# Patient Record
Sex: MALE | Race: WHITE | Employment: UNEMPLOYED | URBAN - METROPOLITAN AREA
[De-identification: names, ages, dates, MRNs, and addresses within clinical notes are randomized per-mention and may not be internally consistent; named-entity substitution may affect disease eponyms.]

---

## 2022-08-10 ENCOUNTER — OFFICE VISIT (OUTPATIENT)
Dept: FAMILY MEDICINE CLINIC | Age: 52
End: 2022-08-10
Payer: COMMERCIAL

## 2022-08-10 VITALS
HEIGHT: 70 IN | HEART RATE: 60 BPM | BODY MASS INDEX: 32.61 KG/M2 | WEIGHT: 227.8 LBS | TEMPERATURE: 98 F | SYSTOLIC BLOOD PRESSURE: 156 MMHG | OXYGEN SATURATION: 97 % | DIASTOLIC BLOOD PRESSURE: 77 MMHG

## 2022-08-10 DIAGNOSIS — E78.00 PURE HYPERCHOLESTEROLEMIA: ICD-10-CM

## 2022-08-10 DIAGNOSIS — R07.89 ATYPICAL CHEST PAIN: ICD-10-CM

## 2022-08-10 DIAGNOSIS — R94.31 ABNORMAL EKG: Primary | ICD-10-CM

## 2022-08-10 DIAGNOSIS — J45.40 MODERATE PERSISTENT ASTHMA WITHOUT COMPLICATION: ICD-10-CM

## 2022-08-10 DIAGNOSIS — R03.0 ELEVATED BLOOD PRESSURE READING IN OFFICE WITHOUT DIAGNOSIS OF HYPERTENSION: ICD-10-CM

## 2022-08-10 LAB
ALBUMIN SERPL-MCNC: 4.1 G/DL (ref 3.5–5)
ALBUMIN/GLOB SERPL: 1.2 {RATIO} (ref 1.1–2.2)
ALP SERPL-CCNC: 87 U/L (ref 45–117)
ALT SERPL-CCNC: 30 U/L (ref 12–78)
ANION GAP SERPL CALC-SCNC: 7 MMOL/L (ref 5–15)
AST SERPL-CCNC: 18 U/L (ref 15–37)
BILIRUB SERPL-MCNC: 0.5 MG/DL (ref 0.2–1)
BUN SERPL-MCNC: 14 MG/DL (ref 6–20)
BUN/CREAT SERPL: 15 (ref 12–20)
CALCIUM SERPL-MCNC: 9.7 MG/DL (ref 8.5–10.1)
CHLORIDE SERPL-SCNC: 109 MMOL/L (ref 97–108)
CHOLEST SERPL-MCNC: 277 MG/DL
CO2 SERPL-SCNC: 25 MMOL/L (ref 21–32)
CREAT SERPL-MCNC: 0.94 MG/DL (ref 0.7–1.3)
ERYTHROCYTE [DISTWIDTH] IN BLOOD BY AUTOMATED COUNT: 13.3 % (ref 11.5–14.5)
EST. AVERAGE GLUCOSE BLD GHB EST-MCNC: 117 MG/DL
GLOBULIN SER CALC-MCNC: 3.3 G/DL (ref 2–4)
GLUCOSE SERPL-MCNC: 105 MG/DL (ref 65–100)
HBA1C MFR BLD: 5.7 % (ref 4–5.6)
HCT VFR BLD AUTO: 44 % (ref 36.6–50.3)
HDLC SERPL-MCNC: 60 MG/DL
HDLC SERPL: 4.6 {RATIO} (ref 0–5)
HGB BLD-MCNC: 14.5 G/DL (ref 12.1–17)
LDLC SERPL CALC-MCNC: 194.6 MG/DL (ref 0–100)
MCH RBC QN AUTO: 27.8 PG (ref 26–34)
MCHC RBC AUTO-ENTMCNC: 33 G/DL (ref 30–36.5)
MCV RBC AUTO: 84.5 FL (ref 80–99)
NRBC # BLD: 0 K/UL (ref 0–0.01)
NRBC BLD-RTO: 0 PER 100 WBC
PLATELET # BLD AUTO: 172 K/UL (ref 150–400)
PMV BLD AUTO: 12.8 FL (ref 8.9–12.9)
POTASSIUM SERPL-SCNC: 4.5 MMOL/L (ref 3.5–5.1)
PROT SERPL-MCNC: 7.4 G/DL (ref 6.4–8.2)
RBC # BLD AUTO: 5.21 M/UL (ref 4.1–5.7)
SODIUM SERPL-SCNC: 141 MMOL/L (ref 136–145)
TRIGL SERPL-MCNC: 112 MG/DL (ref ?–150)
TSH SERPL DL<=0.05 MIU/L-ACNC: 0.91 UIU/ML (ref 0.36–3.74)
VLDLC SERPL CALC-MCNC: 22.4 MG/DL
WBC # BLD AUTO: 7.8 K/UL (ref 4.1–11.1)

## 2022-08-10 PROCEDURE — 93000 ELECTROCARDIOGRAM COMPLETE: CPT | Performed by: STUDENT IN AN ORGANIZED HEALTH CARE EDUCATION/TRAINING PROGRAM

## 2022-08-10 PROCEDURE — 99204 OFFICE O/P NEW MOD 45 MIN: CPT | Performed by: STUDENT IN AN ORGANIZED HEALTH CARE EDUCATION/TRAINING PROGRAM

## 2022-08-10 RX ORDER — ALBUTEROL SULFATE 0.83 MG/ML
2.5 SOLUTION RESPIRATORY (INHALATION) ONCE
Qty: 30 NEBULE | Refills: 2 | Status: SHIPPED | OUTPATIENT
Start: 2022-08-10 | End: 2022-08-10

## 2022-08-10 RX ORDER — MONTELUKAST SODIUM 10 MG/1
10 TABLET ORAL DAILY
Qty: 90 TABLET | Refills: 1 | Status: SHIPPED | OUTPATIENT
Start: 2022-08-10

## 2022-08-10 RX ORDER — ALBUTEROL SULFATE 90 UG/1
2 AEROSOL, METERED RESPIRATORY (INHALATION)
Qty: 1 EACH | Refills: 0 | Status: SHIPPED | OUTPATIENT
Start: 2022-08-10

## 2022-08-10 RX ORDER — FLUTICASONE PROPIONATE 110 UG/1
1 AEROSOL, METERED RESPIRATORY (INHALATION) EVERY 12 HOURS
Qty: 12 G | Refills: 0 | Status: SHIPPED | OUTPATIENT
Start: 2022-08-10

## 2022-08-10 NOTE — LETTER
8/11/2022    Mr. Diamante Knott.  [facility] [address]      Dear Diamante Knott. I attempted to call you to let you know about the results listed below:    Lab Results   Component Value Date/Time    WBC 7.8 08/10/2022 10:15 AM    HGB 14.5 08/10/2022 10:15 AM    HCT 44.0 08/10/2022 10:15 AM    PLATELET 310 58/90/6800 10:15 AM    MCV 84.5 08/10/2022 10:15 AM       Lab Results   Component Value Date/Time    Sodium 141 08/10/2022 10:15 AM    Potassium 4.5 08/10/2022 10:15 AM    Chloride 109 (H) 08/10/2022 10:15 AM    CO2 25 08/10/2022 10:15 AM    Anion gap 7 08/10/2022 10:15 AM    Glucose 105 (H) 08/10/2022 10:15 AM    BUN 14 08/10/2022 10:15 AM    Creatinine 0.94 08/10/2022 10:15 AM    BUN/Creatinine ratio 15 08/10/2022 10:15 AM    GFR est AA >60 08/10/2022 10:15 AM    GFR est non-AA >60 08/10/2022 10:15 AM    Calcium 9.7 08/10/2022 10:15 AM    Bilirubin, total 0.5 08/10/2022 10:15 AM    Alk. phosphatase 87 08/10/2022 10:15 AM    Protein, total 7.4 08/10/2022 10:15 AM    Albumin 4.1 08/10/2022 10:15 AM    Globulin 3.3 08/10/2022 10:15 AM    A-G Ratio 1.2 08/10/2022 10:15 AM    ALT (SGPT) 30 08/10/2022 10:15 AM    AST (SGOT) 18 08/10/2022 10:15 AM       Lab Results   Component Value Date/Time    Cholesterol, total 277 (H) 08/10/2022 10:15 AM    HDL Cholesterol 60 08/10/2022 10:15 AM    LDL, calculated 194.6 (H) 08/10/2022 10:15 AM    VLDL, calculated 22.4 08/10/2022 10:15 AM    Triglyceride 112 08/10/2022 10:15 AM    CHOL/HDL Ratio 4.6 08/10/2022 10:15 AM       Lab Results   Component Value Date/Time    Hemoglobin A1c 5.7 (H) 08/10/2022 10:15 AM       Lab Results   Component Value Date/Time    TSH 0.91 08/10/2022 10:15 AM       RECOMMENDATIONS: The liver, kidneys, blood count, and thyroid are normal. Te=he  The cholesterol (lipid panel) is very high. I recommend starting a cholesterol-lowering medication. I have sent this in to your pharmacy.  Otherwise, we can discuss medications vs lifestyle changes at your upcoming visit. Follow up: 8/24/2022 as scheduled to further discuss results. If you have any questions or concerns, please send me a message in [employer] and I will get back to you usually within 24-48 hours. If you have any issues, please call our [address] at [phone]. For any acute concerns or worsening of any condition, please schedule a follow up appointment with the first available physician.       Sincerely,    Petra Griffiths MD

## 2022-08-10 NOTE — PATIENT INSTRUCTIONS
Please call Central Scheduling [phone] to set up the appointment for the echocardiogram of the heart.

## 2022-08-10 NOTE — PROGRESS NOTES
Colonoscopy: No  Shingles: No  TDAP: 7 years ago  Covid: NO  Identified Patient with two Patient identifiers (Name and ). Two Patient Identifiers confirmed. Reviewed record in preparation for visit and have obtained necessary documentation. Chief Complaint   Patient presents with    Establish Care     Visit Vitals  BP (!) 156/77 (BP 1 Location: Left upper arm, BP Patient Position: Sitting, BP Cuff Size: Large adult)   Pulse 60   Temp 98 °F (36.7 °C) (Oral)   Ht 5' 10\" (1.778 m)   Wt 227 lb 12.8 oz (103.3 kg)   SpO2 97%   BMI 32.69 kg/m²     1. Have you been to the ER, urgent care clinic since your last visit? Hospitalized since your last visit? No    2. Have you seen or consulted any other health care providers outside of the [address] [place] [address] since your last visit? Include any pap smears or colon screening.  No  Health Maintenance Due   Topic Date Due    Hepatitis C Screening  Never done    COVID-19 Vaccine (1) Never done    DTaP/Tdap/Td series (1 - Tdap) Never done    Lipid Screen  Never done    Colorectal Cancer Screening Combo  Never done    Shingrix Vaccine Age 50> (1 of 2) Never done

## 2022-08-10 NOTE — PROGRESS NOTES
Shaheed Fernández. is an 49 y.o. 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, mom works full time  - wants to get more active    #\"ting\" in chest  - not pressure or tightness  - no chest discomfort with exercise  - feels out of shape but no chest discomfort  - no issues going up steps or normal everyday activities  - no diaphoresis or SOB with the discomfort    Feeling odd pressures/aches/pains    #skin lesion on his side  - since 2000  - has grown  - moved with weight gain  - no personal or family history of skin cancer    Allergies - reviewed: Allergies   Allergen Reactions    Iodine Shortness of Breath         Medications - reviewed:   Current Outpatient Medications   Medication Sig    albuterol (PROVENTIL HFA, VENTOLIN HFA, PROAIR HFA) 90 mcg/actuation inhaler Take 2 Puffs by inhalation every six (6) hours as needed for Wheezing. albuterol (PROVENTIL VENTOLIN) 2.5 mg /3 mL (0.083 %) nebu Take 3 mL by inhalation once for 1 dose. fluticasone propionate (FLOVENT HFA) 110 mcg/actuation inhaler Take 1 Puff by inhalation every twelve (12) hours. montelukast (SINGULAIR) 10 mg tablet Take 1 Tablet by mouth in the morning. No current facility-administered medications for this visit. Past Medical History - reviewed:  Past Medical History:   Diagnosis Date    Asthma     Kidney stones          Past Surgical History - reviewed:   Past Surgical History:   Procedure Laterality Date    HX ORTHOPAEDIC           Social History - reviewed:   EtOH none  Tob - never smoker   Marijuana occasionally    ROS: See HPI      Physical Exam  Visit Vitals  BP (!) 156/77 (BP 1 Location: Left upper arm, BP Patient Position: Sitting, BP Cuff Size: Large adult)   Pulse 60   Temp 98 °F (36.7 °C) (Oral)   Ht 5' 10\" (1.778 m)   Wt 227 lb 12.8 oz (103.3 kg)   SpO2 97%   BMI 32.69 kg/m²       General appearance - alert, well appearing, and in no distress  Eyes - pupils equal and reactive, extraocular eye movements intact  Ears - bilateral TM's and external ear canals normal  Nose - normal and patent, no erythema, discharge or polyps  Mouth - mucous membranes moist, pharynx normal without lesions  Neck - supple, no significant adenopathy  Chest - clear to auscultation, no wheezes, rales or rhonchi, symmetric air entry  Heart - normal rate, regularly irregular rhythm, no murmurs, rubs, clicks or gallops  Abdomen - soft, nontender, nondistended, no masses or organomegaly  Neurological - alert, oriented, normal speech, no focal findings or movement disorder noted  Musculoskeletal - no joint tenderness, deformity or swelling  Extremities - peripheral pulses normal, no pedal edema, no clubbing or cyanosis  Skin - large hyperpigmented, scaly, stuck on plaque with central secondary changes from abrasion on left lower abdomen      Assessment/Plan  1. Chest pain, unspecified type  Not concerning for angina, but given EKG below would like cardiology referral.   - AMB POC EKG ROUTINE W/ 12 LEADS, INTER & REP    2. Abnormal EKG  Bigeminy noted on EKG and regularly irregular heart beat on exam. No syncope/LOC, lightheadedness. Echo and referral ordered; Gave number for echo scheduling and cardiology referral for Dr. Aleena Partida.   - Jp Goes; Future  - ECHO ADULT COMPLETE; Future    3. Moderate persistent asthma without complication  Has not tolerated ICS in past due to side effects of triggering migraines. Has not tried in several years however. Will trial montelukast, then ICS.  If no improvement, will need referral to pulm since advanced therapies are always in conjunction with ICS and I am not sure of safety of monotherapy. - albuterol (PROVENTIL HFA, VENTOLIN HFA, PROAIR HFA) 90 mcg/actuation inhaler; Take 2 Puffs by inhalation every six (6) hours as needed for Wheezing. Dispense: 1 Each; Refill: 0  - albuterol (PROVENTIL VENTOLIN) 2.5 mg /3 mL (0.083 %) nebu; Take 3 mL by inhalation once for 1 dose. Dispense: 30 Nebule; Refill: 2  - fluticasone propionate (FLOVENT HFA) 110 mcg/actuation inhaler; Take 1 Puff by inhalation every twelve (12) hours. Dispense: 12 g; Refill: 0    4. Elevated blood pressure reading in office without diagnosis of hypertension  Pt will get BP cuff and monitor for next two weeks. Baseline labs as below. - METABOLIC PANEL, COMPREHENSIVE; Future  - LIPID PANEL; Future  - CBC W/O DIFF; Future  - HEMOGLOBIN A1C WITH EAG; Future  - TSH 3RD GENERATION; Future  - montelukast (SINGULAIR) 10 mg tablet; Take 1 Tablet by mouth in the morning. Dispense: 90 Tablet; Refill: 1        Follow-up and Dispositions    Return in about 2 weeks (around 8/24/2022). I have discussed the diagnosis with the patient and the intended plan as seen in the above orders. Patient verbalized understanding of the plan and agrees with the plan. The patient has received an after-visit summary and questions were answered concerning future plans. I have discussed medication side effects and warnings with the patient as well. Informed patient to return to the office if new symptoms arise.         Ivon Gaffney MD

## 2022-08-10 NOTE — LETTER
8/11/2022    Mr. Surinder Perez.  [facility] [address]      Dear Surinder Perez. I called to let you know that I have reviewed your results listed below:    Lab Results   Component Value Date/Time    WBC 7.8 08/10/2022 10:15 AM    HGB 14.5 08/10/2022 10:15 AM    HCT 44.0 08/10/2022 10:15 AM    PLATELET 411 46/29/9259 10:15 AM    MCV 84.5 08/10/2022 10:15 AM       Lab Results   Component Value Date/Time    Sodium 141 08/10/2022 10:15 AM    Potassium 4.5 08/10/2022 10:15 AM    Chloride 109 (H) 08/10/2022 10:15 AM    CO2 25 08/10/2022 10:15 AM    Anion gap 7 08/10/2022 10:15 AM    Glucose 105 (H) 08/10/2022 10:15 AM    BUN 14 08/10/2022 10:15 AM    Creatinine 0.94 08/10/2022 10:15 AM    BUN/Creatinine ratio 15 08/10/2022 10:15 AM    GFR est AA >60 08/10/2022 10:15 AM    GFR est non-AA >60 08/10/2022 10:15 AM    Calcium 9.7 08/10/2022 10:15 AM    Bilirubin, total 0.5 08/10/2022 10:15 AM    Alk. phosphatase 87 08/10/2022 10:15 AM    Protein, total 7.4 08/10/2022 10:15 AM    Albumin 4.1 08/10/2022 10:15 AM    Globulin 3.3 08/10/2022 10:15 AM    A-G Ratio 1.2 08/10/2022 10:15 AM    ALT (SGPT) 30 08/10/2022 10:15 AM    AST (SGOT) 18 08/10/2022 10:15 AM       Lab Results   Component Value Date/Time    Cholesterol, total 277 (H) 08/10/2022 10:15 AM    HDL Cholesterol 60 08/10/2022 10:15 AM    LDL, calculated 194.6 (H) 08/10/2022 10:15 AM    VLDL, calculated 22.4 08/10/2022 10:15 AM    Triglyceride 112 08/10/2022 10:15 AM    CHOL/HDL Ratio 4.6 08/10/2022 10:15 AM       Lab Results   Component Value Date/Time    Hemoglobin A1c 5.7 (H) 08/10/2022 10:15 AM       Lab Results   Component Value Date/Time    TSH 0.91 08/10/2022 10:15 AM       RECOMMENDATIONS: The liver, kidneys, blood count and thyroid look normal and reassuring. The average blood sugar (Hemoglobin A1c) is slightly elevated, but we have several options to get the blood sugar down including lifestyle changes.  The cholesterol (lipid panel) is very high, particularly the LDL. I would like you to start taking a cholesterol lowering medication called a statin. I have sent this to your pharmacy. Follow up 8/24/2022 as scheduled to check the blood pressure and discuss further. If you have any questions or concerns, please send me a message in User[employer] and I will get back to you usually within 24-48 hours. If you have any issues, please call our [address] at [phone]. For any acute concerns or worsening of any condition, please schedule a follow up appointment with the first available physician.       Sincerely,    Denny Gonzalez MD

## 2022-08-11 RX ORDER — ATORVASTATIN CALCIUM 40 MG/1
40 TABLET, FILM COATED ORAL DAILY
Qty: 90 TABLET | Refills: 3 | Status: SHIPPED | OUTPATIENT
Start: 2022-08-11

## 2022-08-11 NOTE — PROGRESS NOTES
LDL> 190, warrants statin  HA1c 5.7%, prediabetes  Normal CBC, TSH, CMP    Attempted calling patient, no voicemail set up. Will send letter.

## 2022-08-24 ENCOUNTER — OFFICE VISIT (OUTPATIENT)
Dept: FAMILY MEDICINE CLINIC | Age: 52
End: 2022-08-24
Payer: COMMERCIAL

## 2022-08-24 VITALS
OXYGEN SATURATION: 97 % | TEMPERATURE: 98 F | HEIGHT: 70 IN | WEIGHT: 230 LBS | BODY MASS INDEX: 32.93 KG/M2 | HEART RATE: 77 BPM | SYSTOLIC BLOOD PRESSURE: 115 MMHG | DIASTOLIC BLOOD PRESSURE: 66 MMHG

## 2022-08-24 DIAGNOSIS — J45.40 MODERATE PERSISTENT ASTHMA WITHOUT COMPLICATION: ICD-10-CM

## 2022-08-24 DIAGNOSIS — R03.0 ELEVATED BLOOD PRESSURE READING IN OFFICE WITHOUT DIAGNOSIS OF HYPERTENSION: ICD-10-CM

## 2022-08-24 DIAGNOSIS — E78.00 PURE HYPERCHOLESTEROLEMIA: Primary | ICD-10-CM

## 2022-08-24 DIAGNOSIS — R73.03 PREDIABETES: ICD-10-CM

## 2022-08-24 PROCEDURE — 99214 OFFICE O/P EST MOD 30 MIN: CPT | Performed by: STUDENT IN AN ORGANIZED HEALTH CARE EDUCATION/TRAINING PROGRAM

## 2022-08-24 RX ORDER — ALBUTEROL SULFATE 0.83 MG/ML
SOLUTION RESPIRATORY (INHALATION)
COMMUNITY
Start: 2022-08-10

## 2022-08-24 NOTE — PROGRESS NOTES
Precious Negro is an 47 y.o. male who presents for follow up of blood pressure and asthma    #Hypertension  The patient is here for f/u of HTN. The goal blood pressure is <10. The patient is currently taking the following medications/treatments:    - getting rid of all the salty foods   - trying stress reduction  The patient does check BP at home and all BP have been at goal! 130/80 or less  The patient denies dizziness and other side effects from the current BP medications. #Cholesterol  - decreasing processed meat, red meat  - looking at what he eats  - trying to increase vegetables    #asthma  - refilled albuterol last week  - started montelukast  - hasn't taken the allergy medicine  - put a new air filter in his room to deal with the dust    Allergies - reviewed: Allergies   Allergen Reactions    Iodine Shortness of Breath         Medications - reviewed:   Current Outpatient Medications   Medication Sig    albuterol (PROVENTIL VENTOLIN) 2.5 mg /3 mL (0.083 %) nebu USE 1 VIAL IN NEBULIZER ONCE FOR 1 DOSE    atorvastatin (LIPITOR) 40 mg tablet Take 1 Tablet by mouth in the morning. albuterol (PROVENTIL HFA, VENTOLIN HFA, PROAIR HFA) 90 mcg/actuation inhaler Take 2 Puffs by inhalation every six (6) hours as needed for Wheezing. fluticasone propionate (FLOVENT HFA) 110 mcg/actuation inhaler Take 1 Puff by inhalation every twelve (12) hours. montelukast (SINGULAIR) 10 mg tablet Take 1 Tablet by mouth in the morning. No current facility-administered medications for this visit. Past Medical History - reviewed:  Past Medical History:   Diagnosis Date    Asthma     Kidney stones          Past Surgical History - reviewed:   Past Surgical History:   Procedure Laterality Date    HX ORTHOPAEDIC           Social History - reviewed:   EtOH no  Tob - never smoker   Marijuana occasionally    ROS: See HPI  ROS       Physical Exam  Visit Vitals  /66 (BP 1 Location: Left upper arm, BP Patient Position: Sitting, BP Cuff Size: Large adult)   Pulse 77   Temp 98 °F (36.7 °C) (Oral)   Ht 5' 10\" (1.778 m)   Wt 230 lb (104.3 kg)   SpO2 97%   BMI 33.00 kg/m²       General appearance - alert, well appearing, and in no distress  Chest - clear to auscultation, no wheezes, rales or rhonchi, symmetric air entry  Heart -  Normal rate, regularly irregular rhythm, no murmurs  Neurological - alert, oriented, normal speech, no focal findings or movement disorder noted  Musculoskeletal - no joint tenderness, deformity or swelling  Extremities - peripheral pulses normal, no pedal edema, no clubbing or cyanosis  Skin - normal coloration and turgor, no rashes, no suspicious skin lesions noted      Assessment/Plan  1. Pure hypercholesterolemia  Discussed role of statin given LDL>190. Discussed side effects. Patient will start taking. He is also making diet changes, reducing dairy and meat. 2. Moderate persistent asthma without complication  Improved with environmental changes, including air purifier. Responds well to albuterol, using less frequently. 3. Elevated blood pressure reading in office without diagnosis of hypertension  Improving with stress control, reducing sodium. Provided information on lifestyle changes. 4. Prediabetes  Discussed sugar intake, role of increased fiber. Patient will make SMART goals in 3 areas - stress reduction, nutrition, and activity. He will NOT increase activity dramatically until he is seen by cardiology. We will follow up in 3 months to recheck labs. Follow-up and Dispositions    Return in about 3 months (around 11/24/2022) for Cholesterol, prediabetes. I have discussed the diagnosis with the patient and the intended plan as seen in the above orders. Patient verbalized understanding of the plan and agrees with the plan. The patient has received an after-visit summary and questions were answered concerning future plans.   I have discussed medication side effects and warnings with the patient as well. Informed patient to return to the office if new symptoms arise. Patient encounter was >30 minutes was spent of total time spent on the date of the encounter: preparing to see the patient(reviewing prior notes and tests), obtaining and/or reviewing separately obtained history, performing a medially appropriate examination and/or evaluation, counseling and educating the patient/family/caregiver, ordering medications, tests or procedures, documenting clinical information in the electronic or other health record, independently interpreting results(not separately reported) and communicating results to the patient/family/caregiver and care coordination(not separately reported).        Michael Haines MD

## 2022-08-24 NOTE — PROGRESS NOTES
Identified Patient with two Patient identifiers (Name and ). Two Patient Identifiers confirmed. Reviewed record in preparation for visit and have obtained necessary documentation. Chief Complaint   Patient presents with    Blood Pressure Check     Visit Vitals  /66 (BP 1 Location: Left upper arm, BP Patient Position: Sitting, BP Cuff Size: Large adult)   Pulse 77   Temp 98 °F (36.7 °C) (Oral)   Ht 5' 10\" (1.778 m)   Wt 230 lb (104.3 kg)   SpO2 97%   BMI 33.00 kg/m²     1. Have you been to the ER, urgent care clinic since your last visit? Hospitalized since your last visit? No    2. Have you seen or consulted any other health care providers outside of the [address] [place] [address] since your last visit? Include any pap smears or colon screening.  No  Health Maintenance Due   Topic Date Due    Hepatitis C Screening  Never done    COVID-19 Vaccine (1) Never done    Pneumococcal 0-64 years (1 - PCV) Never done    DTaP/Tdap/Td series (1 - Tdap) Never done    Colorectal Cancer Screening Combo  Never done    Shingrix Vaccine Age 50> (1 of 2) Never done

## 2022-09-22 ENCOUNTER — HOSPITAL ENCOUNTER (OUTPATIENT)
Dept: NON INVASIVE DIAGNOSTICS | Age: 52
Discharge: HOME | End: 2022-09-22
Attending: STUDENT IN AN ORGANIZED HEALTH CARE EDUCATION/TRAINING PROGRAM
Payer: COMMERCIAL

## 2022-09-22 VITALS
WEIGHT: 230 LBS | DIASTOLIC BLOOD PRESSURE: 97 MMHG | SYSTOLIC BLOOD PRESSURE: 167 MMHG | BODY MASS INDEX: 32.93 KG/M2 | HEIGHT: 70 IN

## 2022-09-22 DIAGNOSIS — R94.31 ABNORMAL EKG: ICD-10-CM

## 2022-09-22 LAB
ECHO AO ASC DIAM: 3 CM
ECHO AO ASCENDING AORTA INDEX: 1.35 CM/M2
ECHO AV AREA PEAK VELOCITY: 2.7 CM2
ECHO AV AREA VTI: 3 CM2
ECHO AV AREA/BSA PEAK VELOCITY: 1.2 CM2/M2
ECHO AV AREA/BSA VTI: 1.4 CM2/M2
ECHO AV MEAN GRADIENT: 6 MMHG
ECHO AV MEAN VELOCITY: 1.2 M/S
ECHO AV PEAK GRADIENT: 14 MMHG
ECHO AV PEAK VELOCITY: 1.9 M/S
ECHO AV VELOCITY RATIO: 0.58
ECHO AV VTI: 36.5 CM
ECHO LA DIAMETER INDEX: 2.03 CM/M2
ECHO LA DIAMETER: 4.5 CM
ECHO LA VOL 2C: 69 ML (ref 18–58)
ECHO LA VOL 4C: 66 ML (ref 18–58)
ECHO LA VOL BP: 67 ML (ref 18–58)
ECHO LA VOL/BSA BIPLANE: 30 ML/M2 (ref 16–34)
ECHO LA VOLUME AREA LENGTH: 72 ML
ECHO LA VOLUME INDEX A2C: 31 ML/M2 (ref 16–34)
ECHO LA VOLUME INDEX A4C: 30 ML/M2 (ref 16–34)
ECHO LA VOLUME INDEX AREA LENGTH: 32 ML/M2 (ref 16–34)
ECHO LV E' LATERAL VELOCITY: 12 CM/S
ECHO LV E' SEPTAL VELOCITY: 8 CM/S
ECHO LV EDV A2C: 173 ML
ECHO LV EDV A4C: 142 ML
ECHO LV EDV BP: 154 ML (ref 67–155)
ECHO LV EDV INDEX A4C: 64 ML/M2
ECHO LV EDV INDEX BP: 69 ML/M2
ECHO LV EDV NDEX A2C: 78 ML/M2
ECHO LV EJECTION FRACTION A2C: 49 %
ECHO LV EJECTION FRACTION A4C: 38 %
ECHO LV EJECTION FRACTION BIPLANE: 41 % (ref 55–100)
ECHO LV ESV A2C: 88 ML
ECHO LV ESV A4C: 88 ML
ECHO LV ESV BP: 90 ML (ref 22–58)
ECHO LV ESV INDEX A2C: 40 ML/M2
ECHO LV ESV INDEX A4C: 40 ML/M2
ECHO LV ESV INDEX BP: 41 ML/M2
ECHO LV FRACTIONAL SHORTENING: 30 % (ref 28–44)
ECHO LV INTERNAL DIMENSION DIASTOLE INDEX: 2.75 CM/M2
ECHO LV INTERNAL DIMENSION DIASTOLIC: 6.1 CM (ref 4.2–5.9)
ECHO LV INTERNAL DIMENSION SYSTOLIC INDEX: 1.94 CM/M2
ECHO LV INTERNAL DIMENSION SYSTOLIC: 4.3 CM
ECHO LV IVSD: 1.3 CM (ref 0.6–1)
ECHO LV MASS 2D: 341 G (ref 88–224)
ECHO LV MASS INDEX 2D: 153.6 G/M2 (ref 49–115)
ECHO LV POSTERIOR WALL DIASTOLIC: 1.2 CM (ref 0.6–1)
ECHO LV RELATIVE WALL THICKNESS RATIO: 0.39
ECHO LVOT AREA: 4.5 CM2
ECHO LVOT AV VTI INDEX: 0.67
ECHO LVOT DIAM: 2.4 CM
ECHO LVOT MEAN GRADIENT: 2 MMHG
ECHO LVOT PEAK GRADIENT: 5 MMHG
ECHO LVOT PEAK VELOCITY: 1.1 M/S
ECHO LVOT STROKE VOLUME INDEX: 49.5 ML/M2
ECHO LVOT SV: 109.9 ML
ECHO LVOT VTI: 24.3 CM
ECHO MV A VELOCITY: 0.83 M/S
ECHO MV E DECELERATION TIME (DT): 132.7 MS
ECHO MV E VELOCITY: 0.8 M/S
ECHO MV E/A RATIO: 0.96
ECHO MV E/E' LATERAL: 6.67
ECHO MV E/E' RATIO (AVERAGED): 8.33
ECHO MV E/E' SEPTAL: 10
ECHO PV MAX VELOCITY: 1.8 M/S
ECHO PV PEAK GRADIENT: 13 MMHG
ECHO RV INTERNAL DIMENSION: 4.9 CM
ECHO RV TAPSE: 3.7 CM (ref 1.7–?)
ECHO RVOT PEAK GRADIENT: 4 MMHG
ECHO RVOT PEAK VELOCITY: 1 M/S
ECHO TV REGURGITANT MAX VELOCITY: 2.86 M/S
ECHO TV REGURGITANT PEAK GRADIENT: 33 MMHG

## 2022-09-22 PROCEDURE — 93306 TTE W/DOPPLER COMPLETE: CPT | Performed by: INTERNAL MEDICINE

## 2022-09-22 PROCEDURE — 93306 TTE W/DOPPLER COMPLETE: CPT

## 2022-09-28 ENCOUNTER — OFFICE VISIT (OUTPATIENT)
Dept: CARDIOLOGY CLINIC | Age: 52
End: 2022-09-28
Payer: COMMERCIAL

## 2022-09-28 VITALS
SYSTOLIC BLOOD PRESSURE: 140 MMHG | OXYGEN SATURATION: 93 % | DIASTOLIC BLOOD PRESSURE: 78 MMHG | WEIGHT: 218.2 LBS | BODY MASS INDEX: 31.24 KG/M2 | HEIGHT: 70 IN | HEART RATE: 76 BPM

## 2022-09-28 DIAGNOSIS — I42.9 CARDIOMYOPATHY, UNSPECIFIED TYPE (HCC): ICD-10-CM

## 2022-09-28 DIAGNOSIS — J45.40 MODERATE PERSISTENT ASTHMA WITHOUT COMPLICATION: ICD-10-CM

## 2022-09-28 DIAGNOSIS — I10 HYPERTENSION, UNSPECIFIED TYPE: Primary | ICD-10-CM

## 2022-09-28 DIAGNOSIS — R06.02 SOB (SHORTNESS OF BREATH): ICD-10-CM

## 2022-09-28 DIAGNOSIS — R94.31 ABNORMAL EKG: ICD-10-CM

## 2022-09-28 PROCEDURE — 99204 OFFICE O/P NEW MOD 45 MIN: CPT | Performed by: INTERNAL MEDICINE

## 2022-09-28 PROCEDURE — 93000 ELECTROCARDIOGRAM COMPLETE: CPT | Performed by: INTERNAL MEDICINE

## 2022-09-28 NOTE — PROGRESS NOTES
Shayna Meier MD, MS, [facility]            HISTORY OF PRESENT ILLNESS:    Lee Leija. is a 47 y.o. male referred for abnormal EKG. Dr. Lucas Haddad.      + THC, stopped. GF both sides, CAD. Father  young, accident fire. Mom NIKOLAI. Brother 4 years younger, on [de-identified]. EKG NSR,  PVCS. Echo reduced LV fxn EF 41%, multiple wall motion abnormalities - apical anterior, apical inferior, apical lateral and apical septal.    ++ CHAPMAN, has improved maybe with weight los. , severely elevated. Discussed exercised nuc stress. Total time spent >35 min, reviewing my prior notes, referring physician notes, other subspecialty and primary care notes, all available lab values, all available cardiac testing, all available radiology imaging, vital signs, weights, medications, potential medication interactions, family history, preparing my notes, updating diagnoses, correcting chart errors from other providers, documenting the above, discussing findings/results/testing methodologies/plan with patient, ordering tests/lab, sending prescriptions. SUMMARY:   Problem List  Date Reviewed: 2022            Codes Class Noted    SOB (shortness of breath) ICD-10-CM: R06.02  ICD-9-CM: 786.05  2022        Cardiomyopathy ([facility] ICD-10-CM: I42.9  ICD-9-CM: 425.4  2022        Abnormal EKG ICD-10-CM: R94.31  ICD-9-CM: 794.31  8/10/2022        Elevated blood pressure reading in office without diagnosis of hypertension ICD-10-CM: R03.0  ICD-9-CM: 796.2  8/10/2022        Moderate persistent asthma without complication AKB-94-CC: H72.62  ICD-9-CM: 493.90  8/10/2022           Current Outpatient Medications on File Prior to Visit   Medication Sig    albuterol (PROVENTIL VENTOLIN) 2.5 mg /3 mL (0.083 %) nebu USE 1 VIAL IN NEBULIZER ONCE FOR 1 DOSE    atorvastatin (LIPITOR) 40 mg tablet Take 1 Tablet by mouth in the morning.     albuterol (PROVENTIL HFA, VENTOLIN HFA, PROAIR HFA) 90 mcg/actuation inhaler Take 2 Puffs by inhalation every six (6) hours as needed for Wheezing. fluticasone propionate (FLOVENT HFA) 110 mcg/actuation inhaler Take 1 Puff by inhalation every twelve (12) hours. montelukast (SINGULAIR) 10 mg tablet Take 1 Tablet by mouth in the morning. No current facility-administered medications on file prior to visit. CARDIOLOGY STUDIES TO DATE:  No results found for this visit on 09/28/22.      09/22/22    ECHO ADULT COMPLETE 09/22/2022 9/22/2022    Interpretation Summary    Left Ventricle: Moderately reduced left ventricular systolic function. EF by 2D Simpsons Biplane is 41%. Left ventricle size is normal. Normal wall thickness. Findings consistent with mild concentric hypertrophy. Moderate hypokinesis of the following segments: apical anterior, apical inferior, apical lateral and apical septal. Grade I diastolic dysfunction with normal LAP.     Signed by: Caro Campos MD on 9/22/2022  3:24 PM            CARDIAC ROS:   negative    Past Medical History:   Diagnosis Date    Asthma     Kidney stones        Family History   Problem Relation Age of Onset    Hypertension Maternal Grandfather        Social History     Socioeconomic History    Marital status: SINGLE     Spouse name: Not on file    Number of children: Not on file    Years of education: Not on file    Highest education level: Not on file   Occupational History    Not on file   Tobacco Use    Smoking status: Never    Smokeless tobacco: Never   Substance and Sexual Activity    Alcohol use: No    Drug use: Yes     Types: Marijuana     Comment: occasionally    Sexual activity: Not on file   Other Topics Concern    Not on file   Social History Narrative    Not on file     Social Determinants of Health     Financial Resource Strain: Not on file   Food Insecurity: Not on file   Transportation Needs: Not on file   Physical Activity: Not on file   Stress: Not on file   Social Connections: Not on file   Intimate Partner Violence: Not on file Housing Stability: Not on file        GENERAL ROS:  A comprehensive review of systems was negative except for that written in the HPI. Visit Vitals  BP (!) 140/78 (BP 1 Location: Right arm, BP Patient Position: Sitting)   Pulse 76   Ht 5' 10\" (1.778 m)   Wt 218 lb 3.2 oz (99 kg)   SpO2 93%   BMI 31.31 kg/m²     Vitals:    09/28/22 0946 09/28/22 1003   BP: (!) 142/78 (!) 140/78   Pulse: 76    SpO2: 93%    Weight: 218 lb 3.2 oz (99 kg)    Height: 5' 10\" (1.778 m)         Wt Readings from Last 3 Encounters:   09/28/22 218 lb 3.2 oz (99 kg)   09/22/22 230 lb (104.3 kg)   08/24/22 230 lb (104.3 kg)            BP Readings from Last 3 Encounters:   09/28/22 (!) 140/78   09/22/22 (!) 167/97   08/24/22 115/66       PHYSICAL EXAM  General appearance: alert, cooperative, no distress, appears stated age  [de-identified] - no carotid bruits  Lungs  - CTAB  Heart - normal S1S2, no murmur  Ext - no edema    Lab Results   Component Value Date/Time    Cholesterol, total 277 (H) 08/10/2022 10:15 AM    HDL Cholesterol 60 08/10/2022 10:15 AM    LDL, calculated 194.6 (H) 08/10/2022 10:15 AM    Triglyceride 112 08/10/2022 10:15 AM    CHOL/HDL Ratio 4.6 08/10/2022 10:15 AM       Lab Results   Component Value Date/Time    WBC 7.8 08/10/2022 10:15 AM    HGB 14.5 08/10/2022 10:15 AM    HCT 44.0 08/10/2022 10:15 AM    PLATELET 859 62/05/5001 10:15 AM    MCV 84.5 08/10/2022 10:15 AM        Lab Results   Component Value Date/Time    Cholesterol, total 277 (H) 08/10/2022 10:15 AM    HDL Cholesterol 60 08/10/2022 10:15 AM    LDL, calculated 194.6 (H) 08/10/2022 10:15 AM    Triglyceride 112 08/10/2022 10:15 AM    CHOL/HDL Ratio 4.6 08/10/2022 10:15 AM        ASSESSMENT    ICD-10-CM ICD-9-CM    1. Hypertension, unspecified type  I10 401.9 AMB POC EKG ROUTINE W/ 12 LEADS, INTER & REP      NUCLEAR CARDIAC STRESS TEST      2. Cardiomyopathy, unspecified type ([facility]  I42.9 425.4 NUCLEAR CARDIAC STRESS TEST      3. Abnormal EKG  R94.31 794.31       4. Moderate persistent asthma without complication  I30.55 145.98       5. SOB (shortness of breath)  R06.02 786.05           Encounter Diagnoses   Name Primary? Hypertension, unspecified type Yes    Cardi[facility], unspecified type ([facility]     Abnormal EKG     Moderate persistent asthma without complication     SOB (shortness of breath)      Orders Placed This Encounter    AMB POC EKG ROUTINE W/ 12 LEADS, INTER & REP       Plan      Follow-up and Dispositions    Return in about 4 weeks (around 10/26/2022) for with stress same day. Bobby Mir MD  9/28/2022        [address] [place] [address]   [facility], [address] [place] [address]  [phone] (F)    [address] [facility]  [address]  [phone] (P)  [phone] (F)    ATTENTION:   This medical record was transcribed using an electronic medical records/speech recognition system. Although proofread, it may and can contain electronic, spelling and other errors. Corrections may be executed at a later time. Please feel free to contact us for any clarifications as needed.

## 2022-09-28 NOTE — PROGRESS NOTES
Dalila Johnson. is a 47 y.o. male    Chief Complaint   Patient presents with    New Patient     Abn EKG    Cholesterol Problem    Hypertension     Seen by PCP and has lose weight and some diet changes    Chest pain slight discomfort     SOB patient has allergy induced asthma     Dizziness no    Swelling no    Refills no    Visit Vitals  BP (!) 142/78 (BP 1 Location: Left upper arm, BP Patient Position: Sitting)   Ht 5' 10\" (1.778 m)   Wt 218 lb 3.2 oz (99 kg)   BMI 31.31 kg/m²       1. Have you been to the ER, urgent care clinic since your last visit? Hospitalized since your last visit? No    2. Have you seen or consulted any other health care providers outside of the [address] [place] [address] since your last visit? Include any pap smears or colon screening.  No

## 2023-02-07 DIAGNOSIS — J45.40 MODERATE PERSISTENT ASTHMA WITHOUT COMPLICATION: ICD-10-CM

## 2023-02-07 RX ORDER — ALBUTEROL SULFATE 90 UG/1
2 AEROSOL, METERED RESPIRATORY (INHALATION)
Qty: 1 EACH | Refills: 0 | Status: SHIPPED | OUTPATIENT
Start: 2023-02-07

## 2023-02-07 NOTE — TELEPHONE ENCOUNTER
----- Message from Kellie Ramirez sent at 2/7/2023  1:54 PM EST -----  Subject: Refill Request    QUESTIONS  Name of Medication? albuterol (PROVENTIL VENTOLIN) 2.5 mg /3 mL (0.083 %)   nebu  Patient-reported dosage and instructions? 2.5 mg/3ml, as needed   How many days do you have left? 3  Preferred Pharmacy? [address]  Pharmacy phone number (if available)? [phone]  ---------------------------------------------------------------------------  --------------,  Name of Medication? albuterol (PROVENTIL HFA, VENTOLIN HFA, PROAIR HFA) 90   mcg/actuation inhaler  Patient-reported dosage and instructions? ass needed   How many days do you have left? 0  Preferred Pharmacy? [address]  Pharmacy phone number (if available)? [phone]  Additional Information for Provider? rescue inhaler   ---------------------------------------------------------------------------  --------------  CALL BACK INFO  What is the best way for the office to contact you? OK to leave message on   voicemail  Preferred Call Back Phone Number? [phone]  ---------------------------------------------------------------------------  --------------  SCRIPT ANSWERS  Relationship to Patient?  Self

## 2023-03-08 DIAGNOSIS — J45.40 MODERATE PERSISTENT ASTHMA WITHOUT COMPLICATION: ICD-10-CM

## 2023-03-08 RX ORDER — ALBUTEROL SULFATE 0.83 MG/ML
SOLUTION RESPIRATORY (INHALATION)
Qty: 4 EACH | Refills: 1 | Status: SHIPPED | OUTPATIENT
Start: 2023-03-08 | End: 2023-03-09 | Stop reason: SDUPTHER

## 2023-03-08 RX ORDER — ALBUTEROL SULFATE 90 UG/1
2 AEROSOL, METERED RESPIRATORY (INHALATION)
Qty: 1 EACH | Refills: 0 | Status: SHIPPED | OUTPATIENT
Start: 2023-03-08

## 2023-03-08 NOTE — TELEPHONE ENCOUNTER
Pt is scheduled for a Rx refill on March 30th. He is requesting that a refill of the Albuterol inhaler and for you to prescribe him Albuterol for his nebulizer.     Thank you

## 2023-03-09 RX ORDER — ALBUTEROL SULFATE 0.83 MG/ML
2.5 SOLUTION RESPIRATORY (INHALATION)
Qty: 4 EACH | Refills: 1 | Status: SHIPPED | OUTPATIENT
Start: 2023-03-09

## 2023-03-09 NOTE — TELEPHONE ENCOUNTER
[address] called stating that this prescription for the Albuterol needed to have specific directions per his insurance and they are unable to fill the medication without specific directions. The pharmacist would like for a new prescription to be sent at your earliest convenience. Thanks!

## 2023-03-30 ENCOUNTER — OFFICE VISIT (OUTPATIENT)
Dept: FAMILY MEDICINE CLINIC | Age: 53
End: 2023-03-30
Payer: COMMERCIAL

## 2023-03-30 VITALS
BODY MASS INDEX: 32.28 KG/M2 | OXYGEN SATURATION: 99 % | SYSTOLIC BLOOD PRESSURE: 116 MMHG | DIASTOLIC BLOOD PRESSURE: 85 MMHG | TEMPERATURE: 98.9 F | WEIGHT: 225 LBS

## 2023-03-30 DIAGNOSIS — I42.9 CARDIOMYOPATHY, UNSPECIFIED TYPE (HCC): Primary | ICD-10-CM

## 2023-03-30 DIAGNOSIS — E78.00 PURE HYPERCHOLESTEROLEMIA: ICD-10-CM

## 2023-03-30 DIAGNOSIS — J45.40 MODERATE PERSISTENT ASTHMA WITHOUT COMPLICATION: ICD-10-CM

## 2023-03-30 DIAGNOSIS — R21 RASH: ICD-10-CM

## 2023-03-30 DIAGNOSIS — R73.03 PREDIABETES: ICD-10-CM

## 2023-03-30 PROCEDURE — 99214 OFFICE O/P EST MOD 30 MIN: CPT | Performed by: STUDENT IN AN ORGANIZED HEALTH CARE EDUCATION/TRAINING PROGRAM

## 2023-03-30 RX ORDER — MULTIVIT WITH IRON,MINERALS
100 TABLET ORAL EVERY EVENING
COMMUNITY

## 2023-03-30 RX ORDER — ASPIRIN 81 MG/1
1 TABLET ORAL DAILY
COMMUNITY

## 2023-03-30 RX ORDER — ALBUTEROL SULFATE 90 UG/1
2 AEROSOL, METERED RESPIRATORY (INHALATION)
Qty: 2 EACH | Refills: 2 | Status: SHIPPED | OUTPATIENT
Start: 2023-03-30

## 2023-03-30 NOTE — Clinical Note
I encouraged Mr. Zhong to call you to resume testing (never completed the stress test). Also talked about starting the statin (he never took it); he's open to it after checking the lipid panel again today. Thanks!

## 2023-03-30 NOTE — PATIENT INSTRUCTIONS
Please call Dr Anuj Kumari to reschedule stress test. I will be in touch with lipid panel results.      Alberto Camarillo  [address] [place] [address] [address] [place] [address] [address]  Phone: [phone]  Fax: [phone]

## 2023-03-30 NOTE — PROGRESS NOTES
Bee Mruillo. is an 47 y.o. male who presents to follow up on chronic conditions    #cardiomyopathy  - couldn't complete the stress test - insurance and then he got ill  - had been going to the gym, cut out a lot of things from his diet  - side tracked by bad respiratory illness  - back to taking out processed food, meat  - states he hasn't started statin but feels better since trying to go back to healthier diet etc    #Hypertension  The patient is here for f/u of HTN. The goal blood pressure is <130/80. The patient is currently taking the following medications:   - working on [employer], taking out salt  - going to the gym    The patient does check BP at home and all BP have been at goal.     #rash  - itches, bleeds, aggravated  - lower abdomen      Allergies   Allergies   Allergen Reactions    Iodine Shortness of Breath         Medications   Current Outpatient Medications   Medication Sig    aspirin delayed-release 81 mg tablet Take 1 Tablet by mouth daily. nicotinic acid (NIACIN) 100 mg tablet Take 100 mg by mouth every evening. Pt not sure of dose    albuterol (PROVENTIL HFA, VENTOLIN HFA, PROAIR HFA) 90 mcg/actuation inhaler Take 2 Puffs by inhalation every six (6) hours as needed for Wheezing. albuterol (PROVENTIL VENTOLIN) 2.5 mg /3 mL (0.083 %) nebu 3 mL by Nebulization route every six (6) hours as needed for Wheezing. USE 1 VIAL IN NEBULIZER ONCE FOR 1 DOSE    fluticasone propionate (FLOVENT HFA) 110 mcg/actuation inhaler Take 1 Puff by inhalation every twelve (12) hours. atorvastatin (LIPITOR) 40 mg tablet Take 1 Tablet by mouth in the morning. (Patient not taking: Reported on 3/30/2023)     No current facility-administered medications for this visit. Past surgical, medical and social history reviewed and updated as relevant to presenting concerns.      ROS: See HPI      OBJECTIVE  Visit Vitals  /85   Temp 98.9 °F (37.2 °C) (Oral)   Wt 225 lb (102.1 kg)   SpO2 99% BMI 32.28 kg/m²       Physical Exam  General: No acute distress. HEENT: Conjunctiva are clear, sclera non-icteric. Respiratory: Normal work of breathing, talking in full sentences without issue  Musculoskeletal: Normal gait. Skin: Large (10cm) hyperpigmented plague with scale and secondary changes  Neuro: Alert, oriented, speech clear and coherent  Psychiatric: Normal mood and affect      ASSESSMENT & PLAN  1. Cardiomyopathy, unspecified type [facility])  Discussed importance of stress test and then possibility of electrical analysis if stress test is abnormal. Given HLD, suspect abnormal stress test, discussed likelihood of catheterization. Encouraged to call Dr. Juan Darnell and reestablish. 2. Pure hypercholesterolemia  Recheck today after diet changes, discussed recommendation for statin. - LIPID PANEL; Future    3. Moderate persistent asthma without complication  Controlled, needs refill of albuterol.   - albuterol (PROVENTIL HFA, VENTOLIN HFA, PROAIR HFA) 90 mcg/actuation inhaler; Take 2 Puffs by inhalation every six (6) hours as needed for Wheezing. Dispense: 2 Each; Refill: 2    4. Prediabetes  Check A1c again since nutrition changes.   - HEMOGLOBIN A1C WITH EAG; Future  - HEMOGLOBIN A1C WITH EAG    5. Rash  Very large plaque on lower left abdomen. Ddx SK, but too large for cryotherapy. Has been growing. No family history of skin cancer. Given size, referral to derm placed. - REFERRAL TO DERMATOLOGY        Follow-up and Dispositions    Return in about 3 months (around 6/30/2023) for Blood pressure check. I have discussed the diagnosis with the patient and the intended plan as seen in the above orders. Patient verbalized understanding of the plan and agrees with the plan. The patient has received an after-visit summary and questions were answered concerning future plans. I have discussed medication side effects and warnings with the patient as well.  Informed patient to return to the office if new symptoms arise.         Jenn Helms MD

## 2023-03-31 LAB
CHOLEST SERPL-MCNC: 255 MG/DL
EST. AVERAGE GLUCOSE BLD GHB EST-MCNC: 108 MG/DL
HBA1C MFR BLD: 5.4 % (ref 4–5.6)
HDLC SERPL-MCNC: 57 MG/DL
HDLC SERPL: 4.5 (ref 0–5)
LDLC SERPL CALC-MCNC: 170.6 MG/DL (ref 0–100)
TRIGL SERPL-MCNC: 137 MG/DL (ref ?–150)
VLDLC SERPL CALC-MCNC: 27.4 MG/DL

## 2023-04-05 ENCOUNTER — TELEPHONE (OUTPATIENT)
Dept: CARDIOLOGY CLINIC | Age: 53
End: 2023-04-05

## 2023-04-05 NOTE — TELEPHONE ENCOUNTER
Please provide an order for patients scheduled nuclear stress on Monday, 4/17/2023 at 9:00.     Thank you,     Nathalie Adorno

## 2023-04-06 ENCOUNTER — PATIENT MESSAGE (OUTPATIENT)
Dept: CARDIOLOGY CLINIC | Age: 53
End: 2023-04-06

## 2023-04-06 NOTE — TELEPHONE ENCOUNTER
----- Message from [address] sent at 4/6/2023  2:55 PM EDT -----  Subject: Message to Provider    QUESTIONS  Information for Provider? Patient was referred to [facility] for   dermatology and they are requesting that the office fax the referral. The   fax number [phone].   ---------------------------------------------------------------------------  --------------  [address] [facility]  [phone]; OK to leave message on voicemail  ---------------------------------------------------------------------------  --------------  SCRIPT ANSWERS  Relationship to Patient?  Self

## 2023-04-06 NOTE — TELEPHONE ENCOUNTER
Test and follow up originally scheduled for Oct.  When testing rescheduled not follow up was schedule. LVM of addition of testing follow up appt for 4/26 3pm. Asked patient to notify our office if this date and time does not work. My chart msg also sent. Order for exercise nuclear stress test entered.

## 2023-06-19 ENCOUNTER — TELEPHONE (OUTPATIENT)
Age: 53
End: 2023-06-19

## 2023-06-19 NOTE — TELEPHONE ENCOUNTER
Lvm requesting a call back to schedule a 1 day nuclear stress test per Dr. Sekou Durant. Patient is scheduled to see Dr. Sekou Durant on 6/26 if unable to have testing done prior we will need to reschedule the follow up as well. Thanks.

## 2024-06-14 ENCOUNTER — OFFICE VISIT (OUTPATIENT)
Age: 54
End: 2024-06-14
Payer: MEDICAID

## 2024-06-14 VITALS
WEIGHT: 220.8 LBS | RESPIRATION RATE: 18 BRPM | HEART RATE: 92 BPM | TEMPERATURE: 98.4 F | OXYGEN SATURATION: 95 % | BODY MASS INDEX: 31.61 KG/M2 | HEIGHT: 70 IN | DIASTOLIC BLOOD PRESSURE: 65 MMHG | SYSTOLIC BLOOD PRESSURE: 136 MMHG

## 2024-06-14 DIAGNOSIS — J45.40 MODERATE PERSISTENT ASTHMA, UNCOMPLICATED: ICD-10-CM

## 2024-06-14 DIAGNOSIS — L82.1 SK (SEBORRHEIC KERATOSIS): Primary | ICD-10-CM

## 2024-06-14 PROCEDURE — 99213 OFFICE O/P EST LOW 20 MIN: CPT

## 2024-06-14 RX ORDER — ALBUTEROL SULFATE 2.5 MG/3ML
2.5 SOLUTION RESPIRATORY (INHALATION) EVERY 6 HOURS PRN
Qty: 120 EACH | Refills: 1 | Status: SHIPPED | OUTPATIENT
Start: 2024-06-14

## 2024-06-14 RX ORDER — ALBUTEROL SULFATE 90 UG/1
2 AEROSOL, METERED RESPIRATORY (INHALATION) EVERY 6 HOURS PRN
Qty: 18 G | Refills: 1 | Status: SHIPPED | OUTPATIENT
Start: 2024-06-14

## 2024-06-14 SDOH — ECONOMIC STABILITY: HOUSING INSECURITY
IN THE LAST 12 MONTHS, WAS THERE A TIME WHEN YOU DID NOT HAVE A STEADY PLACE TO SLEEP OR SLEPT IN A SHELTER (INCLUDING NOW)?: NO

## 2024-06-14 SDOH — ECONOMIC STABILITY: FOOD INSECURITY: WITHIN THE PAST 12 MONTHS, YOU WORRIED THAT YOUR FOOD WOULD RUN OUT BEFORE YOU GOT MONEY TO BUY MORE.: NEVER TRUE

## 2024-06-14 SDOH — ECONOMIC STABILITY: FOOD INSECURITY: WITHIN THE PAST 12 MONTHS, THE FOOD YOU BOUGHT JUST DIDN'T LAST AND YOU DIDN'T HAVE MONEY TO GET MORE.: NEVER TRUE

## 2024-06-14 SDOH — ECONOMIC STABILITY: INCOME INSECURITY: HOW HARD IS IT FOR YOU TO PAY FOR THE VERY BASICS LIKE FOOD, HOUSING, MEDICAL CARE, AND HEATING?: NOT VERY HARD

## 2024-06-14 ASSESSMENT — PATIENT HEALTH QUESTIONNAIRE - PHQ9
2. FEELING DOWN, DEPRESSED OR HOPELESS: NOT AT ALL
SUM OF ALL RESPONSES TO PHQ QUESTIONS 1-9: 0
SUM OF ALL RESPONSES TO PHQ QUESTIONS 1-9: 0
1. LITTLE INTEREST OR PLEASURE IN DOING THINGS: NOT AT ALL
SUM OF ALL RESPONSES TO PHQ9 QUESTIONS 1 & 2: 0
SUM OF ALL RESPONSES TO PHQ QUESTIONS 1-9: 0
SUM OF ALL RESPONSES TO PHQ QUESTIONS 1-9: 0

## 2024-06-14 NOTE — PROGRESS NOTES
[address] [place] [address]   Office [phone], Fax [phone]    Subjective:     Chief Complaint   Patient presents with    Skin Problem       HPI:  Mike Acevedo Jr. is a 53 y.o. male with that presents for:    Abdominal Lesion:  Left lower abdomen  Pigmented round lesion that has been present for over 20 years  Has slowly grown over that time.   Started as small as a black mole  Now is much larger  Can dry up and bleed only if rubbed  Only painful when irritated by waist line.  Did have another similar lesion in the past in the inguinal area, smaller, and was frozen off successfully frozen off at home.   Was seen a year ago for this and referred to dermatology, but due to insurance was unable to go.            Health Maintenance:  Health Maintenance Due   Topic Date Due    Hepatitis B vaccine (1 of 3 - 3-dose series) Never done    COVID-19 Vaccine (1) Never done    Pneumococcal 0-64 years Vaccine (1 of 2 - PCV) Never done    HIV screen  Never done    Hepatitis C screen  Never done    DTaP/Tdap/Td vaccine (1 - Tdap) Never done    Colorectal Cancer Screen  Never done    Shingles vaccine (1 of 2) Never done    Lipids  03/30/2024    Depression Screen  03/30/2024          Past Medical Hx  I personally reviewed.  Past Medical History:   Diagnosis Date    Asthma     Kidney stones         SocHx   I personally reviewed.  Social History     Socioeconomic History    Marital status: Single     Spouse name: Not on file    Number of children: Not on file    Years of education: Not on file    Highest education level: Not on file   Occupational History    Not on file   Tobacco Use    Smoking status: Never    Smokeless tobacco: Never   Substance and Sexual Activity    Alcohol use: No    Drug use: Yes     Types: Marijuana (Weed)    Sexual activity: Not on file   Other Topics Concern    Not on file   Social History Narrative    Not on file     Social Determinants of Health     Financial Resource Strain:

## 2024-06-14 NOTE — PROGRESS NOTES
Pt has what started out to be a small mole on his stomach for about 24 yrs which has gotten very big.     Identified pt with two pt identifiers(name and ). Reviewed record in preparation for visit and have obtained necessary documentation.  Chief Complaint   Patient presents with    Skin Problem        Vitals:    24 0915   BP: 136/65   Site: Right Upper Arm   Position: Sitting   Cuff Size: Medium Adult   Pulse: 92   Resp: 18   Temp: 98.4 °F (36.9 °C)   TempSrc: Oral   SpO2: 95%   Weight: 100.2 kg (220 lb 12.8 oz)   Height: 1.778 m (5' 10\")         Coordination of Care Questionnaire:  :     \"Have you been to the ER, urgent care clinic since your last visit?  Hospitalized since your last visit?\"    NO    “Have you seen or consulted any other health care providers outside of [facility] since your last visit?”    NO        “Have you had a colorectal cancer screening such as a colonoscopy/FIT/Cologuard?    NO    No colonoscopy on file  No cologuard on file  No FIT/FOBT on file   No flexible sigmoidoscopy on file         Click Here for Release of Records Request

## 2025-03-26 ENCOUNTER — OFFICE VISIT (OUTPATIENT)
Age: 55
End: 2025-03-26
Payer: MEDICAID

## 2025-03-26 VITALS
BODY MASS INDEX: 32.5 KG/M2 | WEIGHT: 227 LBS | SYSTOLIC BLOOD PRESSURE: 119 MMHG | HEIGHT: 70 IN | DIASTOLIC BLOOD PRESSURE: 80 MMHG | TEMPERATURE: 98.3 F | HEART RATE: 93 BPM | OXYGEN SATURATION: 95 %

## 2025-03-26 DIAGNOSIS — J18.9 WALKING PNEUMONIA: Primary | ICD-10-CM

## 2025-03-26 PROCEDURE — 99213 OFFICE O/P EST LOW 20 MIN: CPT | Performed by: STUDENT IN AN ORGANIZED HEALTH CARE EDUCATION/TRAINING PROGRAM

## 2025-03-26 RX ORDER — DOXYCYCLINE HYCLATE 100 MG
100 TABLET ORAL 2 TIMES DAILY
Qty: 14 TABLET | Refills: 0 | Status: SHIPPED | OUTPATIENT
Start: 2025-03-26 | End: 2025-04-02

## 2025-03-26 NOTE — PROGRESS NOTES
[facility] Clinic Note      History of Present Illness:     Chief Complaint   Patient presents with    Congestion     227lb  History of asthma, productive cough-Mucinex tried, for 1 month       Mike Acevedo Jr. is a 54 y.o. male with a PMH notable for asthma who presents for cough. Had a cold in mid February, did better with OTC meds, dry cough never went away, not with productive cough, lots of chest congestion. No fevers. Breathing ok. Asthma is fairly well controlled, uses albuterol only, usually only required 1-2 times per week, has been using nebulizer since sick which helps a bit. Mucinex helped some.    PMH (REVIEWED):  Past Medical History:   Diagnosis Date    Asthma     Kidney stones        Current Medications/Allergies (REVIEWED):     Current Outpatient Medications on File Prior to Visit   Medication Sig Dispense Refill    albuterol (PROVENTIL) (2.5 MG/3ML) 0.083% nebulizer solution Take 3 mLs by nebulization every 6 hours as needed for Shortness of Breath or Wheezing 120 each 1    albuterol sulfate HFA (PROVENTIL;VENTOLIN;PROAIR) 108 (90 Base) MCG/ACT inhaler Inhale 2 puffs into the lungs every 6 hours as needed for Wheezing or Shortness of Breath 18 g 1    fluticasone (FLOVENT HFA) 110 MCG/ACT inhaler Inhale 1 puff into the lungs in the morning and 1 puff in the evening.       No current facility-administered medications on file prior to visit.       Allergies   Allergen Reactions    Iodine Shortness Of Breath         Review of Systems:     Review of Systems as per HPI    Objective:     Vitals:    03/26/25 1504   BP: 119/80   BP Site: Right Upper Arm   Patient Position: Sitting   BP Cuff Size: Medium Adult   Pulse: 93   Temp: 98.3 °F (36.8 °C)   TempSrc: Oral   SpO2: 95%   Weight: 103 kg (227 lb)   Height: 1.778 m (5' 10\")       Physical Exam:   General: NAD, well appearing  Resp: no increased WOB, there is scattered rhonchi in bilateral lower lung fields.  Cardiac: color good, appears

## 2025-03-26 NOTE — PROGRESS NOTES
Mike Acevedo Jr. is a 54 y.o. male      Chief Complaint   Patient presents with    Congestion     227lb  History of asthma, productive cough-Mucinex tried, for 1 month       \"Have you been to the ER, urgent care clinic since your last visit?  Hospitalized since your last visit?\"    NO    “Have you seen or consulted any other health care providers outside of [facility] since your last visit?”    NO      “Have you had a colorectal cancer screening such as a colonoscopy/FIT/Cologuard?    NO    No colonoscopy on file  No cologuard on file  No FIT/FOBT on file   No flexible sigmoidoscopy on file         Click Here for Release of Records Request    Vitals:    03/26/25 1504   BP: 119/80   BP Site: Right Upper Arm   Patient Position: Sitting   BP Cuff Size: Medium Adult   Pulse: 93   Temp: 98.3 °F (36.8 °C)   TempSrc: Oral   SpO2: 95%   Weight: 103 kg (227 lb)   Height: 1.778 m (5' 10\")           Medication Reconciliation Completed, changes notes. Please Update medication list.

## 2025-07-10 ENCOUNTER — COMMUNITY OUTREACH (OUTPATIENT)
Age: 55
End: 2025-07-10

## 2025-08-21 DIAGNOSIS — J45.40 MODERATE PERSISTENT ASTHMA, UNCOMPLICATED: ICD-10-CM

## 2025-08-22 RX ORDER — ALBUTEROL SULFATE 90 UG/1
2 INHALANT RESPIRATORY (INHALATION) EVERY 6 HOURS PRN
Qty: 18 G | Refills: 1 | Status: SHIPPED | OUTPATIENT
Start: 2025-08-22

## 2025-08-22 RX ORDER — ALBUTEROL SULFATE 0.83 MG/ML
2.5 SOLUTION RESPIRATORY (INHALATION) EVERY 6 HOURS PRN
Qty: 120 EACH | Refills: 1 | Status: SHIPPED | OUTPATIENT
Start: 2025-08-22

## 2025-08-25 ENCOUNTER — TELEPHONE (OUTPATIENT)
Age: 55
End: 2025-08-25